# Patient Record
Sex: FEMALE | Race: BLACK OR AFRICAN AMERICAN | NOT HISPANIC OR LATINO | Employment: PART TIME | URBAN - METROPOLITAN AREA
[De-identification: names, ages, dates, MRNs, and addresses within clinical notes are randomized per-mention and may not be internally consistent; named-entity substitution may affect disease eponyms.]

---

## 2020-07-05 ENCOUNTER — APPOINTMENT (EMERGENCY)
Dept: RADIOLOGY | Facility: HOSPITAL | Age: 20
End: 2020-07-05
Payer: COMMERCIAL

## 2020-07-05 ENCOUNTER — HOSPITAL ENCOUNTER (EMERGENCY)
Facility: HOSPITAL | Age: 20
Discharge: HOME | End: 2020-07-05
Attending: EMERGENCY MEDICINE | Admitting: EMERGENCY MEDICINE
Payer: COMMERCIAL

## 2020-07-05 VITALS
DIASTOLIC BLOOD PRESSURE: 62 MMHG | TEMPERATURE: 97.8 F | SYSTOLIC BLOOD PRESSURE: 121 MMHG | RESPIRATION RATE: 18 BRPM | HEART RATE: 66 BPM | OXYGEN SATURATION: 100 %

## 2020-07-05 DIAGNOSIS — R19.7 DIARRHEA: Primary | ICD-10-CM

## 2020-07-05 LAB
ALBUMIN SERPL BCP-MCNC: 3.8 G/DL (ref 3.5–5)
ALP SERPL-CCNC: 76 U/L (ref 46–116)
ALT SERPL W P-5'-P-CCNC: 55 U/L (ref 12–78)
ANION GAP SERPL CALCULATED.3IONS-SCNC: 8 MMOL/L (ref 4–13)
APTT PPP: 22 SECONDS (ref 23–37)
AST SERPL W P-5'-P-CCNC: 31 U/L (ref 5–45)
BACTERIA UR QL AUTO: ABNORMAL /HPF
BASOPHILS # BLD AUTO: 0.02 THOUSANDS/ΜL (ref 0–0.1)
BASOPHILS NFR BLD AUTO: 0 % (ref 0–1)
BILIRUB SERPL-MCNC: 0.7 MG/DL (ref 0.2–1)
BILIRUB UR QL STRIP: NEGATIVE
BUN SERPL-MCNC: 9 MG/DL (ref 5–25)
CALCIUM SERPL-MCNC: 8.5 MG/DL (ref 8.3–10.1)
CHLORIDE SERPL-SCNC: 103 MMOL/L (ref 100–108)
CLARITY UR: CLEAR
CO2 SERPL-SCNC: 26 MMOL/L (ref 21–32)
COLOR UR: YELLOW
CREAT SERPL-MCNC: 0.82 MG/DL (ref 0.6–1.3)
EOSINOPHIL # BLD AUTO: 0.33 THOUSAND/ΜL (ref 0–0.61)
EOSINOPHIL NFR BLD AUTO: 6 % (ref 0–6)
ERYTHROCYTE [DISTWIDTH] IN BLOOD BY AUTOMATED COUNT: 12.7 % (ref 11.6–15.1)
EXT PREG TEST URINE: NEGATIVE
EXT. CONTROL ED NAV: NORMAL
GFR SERPL CREATININE-BSD FRML MDRD: 119 ML/MIN/1.73SQ M
GLUCOSE SERPL-MCNC: 93 MG/DL (ref 65–140)
GLUCOSE UR STRIP-MCNC: NEGATIVE MG/DL
HCT VFR BLD AUTO: 39.9 % (ref 34.8–46.1)
HGB BLD-MCNC: 13.4 G/DL (ref 11.5–15.4)
HGB UR QL STRIP.AUTO: NEGATIVE
IMM GRANULOCYTES # BLD AUTO: 0.02 THOUSAND/UL (ref 0–0.2)
IMM GRANULOCYTES NFR BLD AUTO: 0 % (ref 0–2)
INR PPP: 0.99 (ref 0.84–1.19)
KETONES UR STRIP-MCNC: NEGATIVE MG/DL
LEUKOCYTE ESTERASE UR QL STRIP: ABNORMAL
LIPASE SERPL-CCNC: 173 U/L (ref 73–393)
LYMPHOCYTES # BLD AUTO: 1.62 THOUSANDS/ΜL (ref 0.6–4.47)
LYMPHOCYTES NFR BLD AUTO: 27 % (ref 14–44)
MAGNESIUM SERPL-MCNC: 1.9 MG/DL (ref 1.6–2.6)
MCH RBC QN AUTO: 32 PG (ref 26.8–34.3)
MCHC RBC AUTO-ENTMCNC: 33.6 G/DL (ref 31.4–37.4)
MCV RBC AUTO: 95 FL (ref 82–98)
MONOCYTES # BLD AUTO: 0.6 THOUSAND/ΜL (ref 0.17–1.22)
MONOCYTES NFR BLD AUTO: 10 % (ref 4–12)
NEUTROPHILS # BLD AUTO: 3.42 THOUSANDS/ΜL (ref 1.85–7.62)
NEUTS SEG NFR BLD AUTO: 57 % (ref 43–75)
NITRITE UR QL STRIP: NEGATIVE
NON-SQ EPI CELLS URNS QL MICRO: ABNORMAL /HPF
NRBC BLD AUTO-RTO: 0 /100 WBCS
OTHER STN SPEC: ABNORMAL
PH UR STRIP.AUTO: 7 [PH]
PLATELET # BLD AUTO: 257 THOUSANDS/UL (ref 149–390)
PMV BLD AUTO: 11.6 FL (ref 8.9–12.7)
POTASSIUM SERPL-SCNC: 4 MMOL/L (ref 3.5–5.3)
PROT SERPL-MCNC: 7.9 G/DL (ref 6.4–8.2)
PROT UR STRIP-MCNC: NEGATIVE MG/DL
PROTHROMBIN TIME: 13 SECONDS (ref 11.6–14.5)
RBC # BLD AUTO: 4.19 MILLION/UL (ref 3.81–5.12)
RBC #/AREA URNS AUTO: ABNORMAL /HPF
SODIUM SERPL-SCNC: 137 MMOL/L (ref 136–145)
SP GR UR STRIP.AUTO: 1.01 (ref 1–1.03)
UROBILINOGEN UR QL STRIP.AUTO: 0.2 E.U./DL
WBC # BLD AUTO: 6.01 THOUSAND/UL (ref 4.31–10.16)
WBC #/AREA URNS AUTO: ABNORMAL /HPF

## 2020-07-05 PROCEDURE — 85025 COMPLETE CBC W/AUTO DIFF WBC: CPT | Performed by: PHYSICIAN ASSISTANT

## 2020-07-05 PROCEDURE — 99284 EMERGENCY DEPT VISIT MOD MDM: CPT

## 2020-07-05 PROCEDURE — 83690 ASSAY OF LIPASE: CPT | Performed by: PHYSICIAN ASSISTANT

## 2020-07-05 PROCEDURE — 96374 THER/PROPH/DIAG INJ IV PUSH: CPT

## 2020-07-05 PROCEDURE — 83735 ASSAY OF MAGNESIUM: CPT | Performed by: PHYSICIAN ASSISTANT

## 2020-07-05 PROCEDURE — 96361 HYDRATE IV INFUSION ADD-ON: CPT

## 2020-07-05 PROCEDURE — 99285 EMERGENCY DEPT VISIT HI MDM: CPT | Performed by: PHYSICIAN ASSISTANT

## 2020-07-05 PROCEDURE — 85610 PROTHROMBIN TIME: CPT | Performed by: PHYSICIAN ASSISTANT

## 2020-07-05 PROCEDURE — 36415 COLL VENOUS BLD VENIPUNCTURE: CPT | Performed by: PHYSICIAN ASSISTANT

## 2020-07-05 PROCEDURE — 74177 CT ABD & PELVIS W/CONTRAST: CPT

## 2020-07-05 PROCEDURE — 80053 COMPREHEN METABOLIC PANEL: CPT | Performed by: PHYSICIAN ASSISTANT

## 2020-07-05 PROCEDURE — 81001 URINALYSIS AUTO W/SCOPE: CPT | Performed by: PHYSICIAN ASSISTANT

## 2020-07-05 PROCEDURE — 85730 THROMBOPLASTIN TIME PARTIAL: CPT | Performed by: PHYSICIAN ASSISTANT

## 2020-07-05 PROCEDURE — 81025 URINE PREGNANCY TEST: CPT | Performed by: PHYSICIAN ASSISTANT

## 2020-07-05 RX ORDER — ONDANSETRON 4 MG/1
4 TABLET, ORALLY DISINTEGRATING ORAL EVERY 6 HOURS PRN
Qty: 20 TABLET | Refills: 0 | Status: SHIPPED | OUTPATIENT
Start: 2020-07-05

## 2020-07-05 RX ORDER — ONDANSETRON 2 MG/ML
4 INJECTION INTRAMUSCULAR; INTRAVENOUS ONCE
Status: COMPLETED | OUTPATIENT
Start: 2020-07-05 | End: 2020-07-05

## 2020-07-05 RX ORDER — ESCITALOPRAM OXALATE 20 MG/1
20 TABLET ORAL DAILY
COMMUNITY

## 2020-07-05 RX ADMIN — SODIUM CHLORIDE 1000 ML: 0.9 INJECTION, SOLUTION INTRAVENOUS at 14:07

## 2020-07-05 RX ADMIN — ONDANSETRON 4 MG: 2 INJECTION INTRAMUSCULAR; INTRAVENOUS at 14:17

## 2020-07-05 RX ADMIN — IOHEXOL 100 ML: 350 INJECTION, SOLUTION INTRAVENOUS at 15:06

## 2020-07-05 NOTE — ED PROVIDER NOTES
History  Chief Complaint   Patient presents with    Diarrhea     pt presents to the ed with diarrhea for 1 wk  the pt states when she wiped she saw a blood clot      25-year-old female presents with diarrhea x1 week  She states that prior to the diarrhea she missed several doses of Lexapro which she has restarted  She states that she has had 2-3 soft bowel a day  Her normal bowel habits are having a bowel movement twice a week  Today after having a bowel movement she noticed a blood clot when she wiped  LMP 3 months ago, has history of irregular periods, not on any birth control, family history of endometriosis and ovarian cysts  Not sexually active  Has not seen an gyn  No fever, chills, chest pain, difficulty breathing, shortness of breath  No problems with urination  Prior to Admission Medications   Prescriptions Last Dose Informant Patient Reported? Taking?   escitalopram (LEXAPRO) 20 mg tablet   Yes Yes   Sig: Take 20 mg by mouth daily      Facility-Administered Medications: None       Past Medical History:   Diagnosis Date    Anxiety     Asthma        History reviewed  No pertinent surgical history  History reviewed  No pertinent family history  I have reviewed and agree with the history as documented  E-Cigarette/Vaping     E-Cigarette/Vaping Substances     Social History     Tobacco Use    Smoking status: Never Smoker   Substance Use Topics    Alcohol use: Never     Frequency: Never    Drug use: Yes     Types: Marijuana       Review of Systems   Constitutional: Negative for chills and fever  HENT: Negative for sneezing and sore throat  Respiratory: Negative for cough and shortness of breath  Cardiovascular: Negative for chest pain, palpitations and leg swelling  Gastrointestinal: Positive for abdominal pain and diarrhea  Negative for constipation, nausea and vomiting     Genitourinary: Negative for decreased urine volume, difficulty urinating, dysuria, flank pain, frequency, hematuria, pelvic pain, urgency, vaginal bleeding, vaginal discharge and vaginal pain  Musculoskeletal: Negative for back pain, gait problem and joint swelling  Skin: Negative for color change, pallor, rash and wound  Neurological: Negative for dizziness, syncope, weakness, light-headedness, numbness and headaches  All other systems reviewed and are negative  Physical Exam  Physical Exam   Constitutional: She appears well-developed and well-nourished  No distress  HENT:   Head: Normocephalic and atraumatic  Nose: Nose normal    Eyes: EOM are normal    Neck: Normal range of motion  Cardiovascular: Normal rate, regular rhythm, normal heart sounds and intact distal pulses  Exam reveals no gallop and no friction rub  No murmur heard  Pulmonary/Chest: Effort normal and breath sounds normal  No stridor  No respiratory distress  She has no wheezes  She has no rales  Sp02 is 99% indicating adequate oxygenation on room air   Abdominal: Soft  Bowel sounds are normal  She exhibits no distension and no mass  There is tenderness  There is no guarding  Generalized tenderness over lower abdomen without peritoneal signs  Negative CVA tenderness  Negative Muhammad sign  Negative McBurney point   Genitourinary: Rectum normal  Rectal exam shows no external hemorrhoid, no internal hemorrhoid, no fissure, no mass, no tenderness, anal tone normal and guaiac negative stool  Genitourinary Comments: Rectal exam performed with Codey Norton RN as chaperone  Negative hemoccult test, no hemorrhoids or fissures   Skin: Skin is warm and dry  Capillary refill takes less than 2 seconds  No rash noted  She is not diaphoretic  No erythema  No pallor  Nursing note and vitals reviewed        Vital Signs  ED Triage Vitals   Temperature Pulse Respirations Blood Pressure SpO2   07/05/20 1323 07/05/20 1323 07/05/20 1323 07/05/20 1323 07/05/20 1323   97 8 °F (36 6 °C) 87 20 139/84 99 %      Temp Source Heart Rate Source Patient Position - Orthostatic VS BP Location FiO2 (%)   07/05/20 1323 07/05/20 1323 07/05/20 1531 07/05/20 1531 --   Tympanic Monitor Lying Left arm       Pain Score       --                  Vitals:    07/05/20 1323 07/05/20 1531   BP: 139/84 121/62   Pulse: 87 66   Patient Position - Orthostatic VS:  Lying         Visual Acuity      ED Medications  Medications   sodium chloride 0 9 % bolus 1,000 mL (0 mL Intravenous Stopped 7/5/20 1507)   ondansetron (ZOFRAN) injection 4 mg (4 mg Intravenous Given 7/5/20 1417)   iohexol (OMNIPAQUE) 350 MG/ML injection (MULTI-DOSE) 100 mL (100 mL Intravenous Given 7/5/20 1506)       Diagnostic Studies  Results Reviewed     Procedure Component Value Units Date/Time    Urine Microscopic [[id number]]  (Abnormal) Collected:  07/05/20 1445    Lab Status:  Final result Specimen:  Urine, Clean Catch Updated:  07/05/20 1503     RBC, UA None Seen /hpf      WBC, UA 4-10 /hpf      Epithelial Cells Occasional /hpf      Bacteria, UA Occasional /hpf      OTHER OBSERVATIONS Renal Epithelial Cells Present    UA w Reflex to Microscopic w Reflex to Culture [[id number]]  (Abnormal) Collected:  07/05/20 1445    Lab Status:  Final result Specimen:  Urine, Clean Catch Updated:  07/05/20 1454     Color, UA Yellow     Clarity, UA Clear     Specific Gravity, UA 1 010     pH, UA 7 0     Leukocytes, UA Small     Nitrite, UA Negative     Protein, UA Negative mg/dl      Glucose, UA Negative mg/dl      Ketones, UA Negative mg/dl      Urobilinogen, UA 0 2 E U /dl      Bilirubin, UA Negative     Blood, UA Negative    POCT pregnancy, urine [[id number]]  (Normal) Resulted:  07/05/20 1445    Lab Status:  Final result Updated:  07/05/20 1452     EXT PREG TEST UR (Ref: Negative) negative     Control valid    Lipase [[id number]]  (Normal) Collected:  07/05/20 1346    Lab Status:  Final result Specimen:  Blood from Arm, Left Updated:  07/05/20 1408     Lipase 173 u/L     Comprehensive metabolic panel [[id number]] Collected: 07/05/20 1346    Lab Status:  Final result Specimen:  Blood from Arm, Left Updated:  07/05/20 1408     Sodium 137 mmol/L      Potassium 4 0 mmol/L      Chloride 103 mmol/L      CO2 26 mmol/L      ANION GAP 8 mmol/L      BUN 9 mg/dL      Creatinine 0 82 mg/dL      Glucose 93 mg/dL      Calcium 8 5 mg/dL      AST 31 U/L      ALT 55 U/L      Alkaline Phosphatase 76 U/L      Total Protein 7 9 g/dL      Albumin 3 8 g/dL      Total Bilirubin 0 70 mg/dL      eGFR 119 ml/min/1 73sq m     Narrative:       [place] guidelines for Chronic Kidney Disease (CKD):     Stage 1 with normal or high GFR (GFR > 90 mL/min/1 73 square meters)    Stage 2 Mild CKD (GFR = 60-89 mL/min/1 73 square meters)    Stage 3A Moderate CKD (GFR = 45-59 mL/min/1 73 square meters)    Stage 3B Moderate CKD (GFR = 30-44 mL/min/1 73 square meters)    Stage 4 Severe CKD (GFR = 15-29 mL/min/1 73 square meters)    Stage 5 End Stage CKD (GFR <15 mL/min/1 73 square meters)  Note: GFR calculation is accurate only with a steady state creatinine    Magnesium [[id number]]  (Normal) Collected:  07/05/20 1346    Lab Status:  Final result Specimen:  Blood from Arm, Left Updated:  07/05/20 1408     Magnesium 1 9 mg/dL     Protime-INR [[id number]]  (Normal) Collected:  07/05/20 1346    Lab Status:  Final result Specimen:  Blood from Arm, Left Updated:  07/05/20 1407     Protime 13 0 seconds      INR 0 99    APTT [[id number]]  (Abnormal) Collected:  07/05/20 1346    Lab Status:  Final result Specimen:  Blood from Arm, Left Updated:  07/05/20 1407     PTT 22 seconds     CBC and differential [[id number]] Collected:  07/05/20 1346    Lab Status:  Final result Specimen:  Blood from Arm, Left Updated:  07/05/20 1352     WBC 6 01 Thousand/uL      RBC 4 19 Million/uL      Hemoglobin 13 4 g/dL      Hematocrit 39 9 %      MCV 95 fL      MCH 32 0 pg      MCHC 33 6 g/dL      RDW 12 7 %      MPV 11 6 fL      Platelets 103 Thousands/uL      nRBC 0 /100 WBCs      Neutrophils Relative 57 %      Immat GRANS % 0 %      Lymphocytes Relative 27 %      Monocytes Relative 10 %      Eosinophils Relative 6 %      Basophils Relative 0 %      Neutrophils Absolute 3 42 Thousands/µL      Immature Grans Absolute 0 02 Thousand/uL      Lymphocytes Absolute 1 62 Thousands/µL      Monocytes Absolute 0 60 Thousand/µL      Eosinophils Absolute 0 33 Thousand/µL      Basophils Absolute 0 02 Thousands/µL                  CT abdomen pelvis with contrast   Final Result by Judah Viveros MD (07/05 1536)      No acute abnormality in the abdomen or pelvis  Workstation performed: [id number]                    Procedures  Procedures         ED Course                                             MDM  Number of Diagnoses or Management Options  Diarrhea:   Diagnosis management comments: Patient well appearing, afebrile, in no acute distress  Advised BRAT diet and frequent handwashing  Will trial on zofran prn nausea  States she would like to follow up with a gyn for routine well checks, given info  Gave patient proper education regarding diagnosis  Answered all questions  Return to ED for any worsening of symptoms otherwise follow up with primary care physician for re-evaluation  Discussed plan with patient who verbalized understanding and agreed to plan         Amount and/or Complexity of Data Reviewed  Clinical lab tests: reviewed and ordered  Tests in the radiology section of CPT®: ordered and reviewed  Tests in the medicine section of CPT®: ordered and reviewed  Discussion of test results with the performing providers: yes  Review and summarize past medical records: yes  Discuss the patient with other providers: yes  Independent visualization of images, tracings, or specimens: yes          Disposition  Final diagnoses:   Diarrhea     Time reflects when diagnosis was documented in both MDM as applicable and the Disposition within this note     Time User Action Codes Description Comment 7/5/2020  3:40 PM Desiree Lezama Add [R19 7] Diarrhea       ED Disposition     ED Disposition Condition Date/Time Comment    Discharge Stable Sun Jul 5, 2020  3:40 PM Ama Small discharge to home/self care  Follow-up Information     Follow up With Specialties Details Why Contact Info Additional Information    Koby Flood MD Obstetrics and Gynecology, Obstetrics, Gynecology Schedule an appointment as soon as possible for a visit in 1 week for routine gyn care [address] [facility] [address]  Emergency Department Emergency Medicine Go to  As needed [address] [facility] ED, [place], [place], [address]          Patient's Medications   Discharge Prescriptions    ONDANSETRON (ZOFRAN-ODT) 4 MG DISINTEGRATING TABLET    Take 1 tablet (4 mg total) by mouth every 6 (six) hours as needed for nausea or vomiting       Start Date: 7/5/2020  End Date: --       Order Dose: 4 mg       Quantity: 20 tablet    Refills: 0     No discharge procedures on file      PDMP Review     None          ED Provider  Electronically Signed by           Alida Heller PA-C  07/05/20 6530

## 2021-11-07 ENCOUNTER — HOSPITAL ENCOUNTER (EMERGENCY)
Facility: HOSPITAL | Age: 21
Discharge: HOME | End: 2021-11-07
Attending: EMERGENCY MEDICINE | Admitting: EMERGENCY MEDICINE
Payer: COMMERCIAL

## 2021-11-07 VITALS
BODY MASS INDEX: 33.63 KG/M2 | SYSTOLIC BLOOD PRESSURE: 145 MMHG | OXYGEN SATURATION: 99 % | WEIGHT: 197 LBS | RESPIRATION RATE: 18 BRPM | HEART RATE: 92 BPM | TEMPERATURE: 97.8 F | HEIGHT: 64 IN | DIASTOLIC BLOOD PRESSURE: 70 MMHG

## 2021-11-07 DIAGNOSIS — R11.2 NAUSEA AND VOMITING: Primary | ICD-10-CM

## 2021-11-07 DIAGNOSIS — F41.9 ANXIETY: ICD-10-CM

## 2021-11-07 DIAGNOSIS — T50.905A ADVERSE EFFECT OF DRUG, INITIAL ENCOUNTER: ICD-10-CM

## 2021-11-07 LAB
ALBUMIN SERPL BCP-MCNC: 4.4 G/DL (ref 3.5–5)
ALP SERPL-CCNC: 73 U/L (ref 46–116)
ALT SERPL W P-5'-P-CCNC: 69 U/L (ref 12–78)
AMPHETAMINES SERPL QL SCN: NEGATIVE
ANION GAP SERPL CALCULATED.3IONS-SCNC: 21 MMOL/L (ref 4–13)
AST SERPL W P-5'-P-CCNC: 25 U/L (ref 5–45)
BACTERIA UR QL AUTO: NORMAL /HPF
BARBITURATES UR QL: NEGATIVE
BASOPHILS # BLD AUTO: 0.01 THOUSANDS/ΜL (ref 0–0.1)
BASOPHILS NFR BLD AUTO: 0 % (ref 0–1)
BENZODIAZ UR QL: NEGATIVE
BILIRUB SERPL-MCNC: 1.08 MG/DL (ref 0.2–1)
BILIRUB UR QL STRIP: ABNORMAL
BUN SERPL-MCNC: 6 MG/DL (ref 5–25)
CALCIUM SERPL-MCNC: 9.7 MG/DL (ref 8.3–10.1)
CHLORIDE SERPL-SCNC: 100 MMOL/L (ref 100–108)
CLARITY UR: CLEAR
CO2 SERPL-SCNC: 16 MMOL/L (ref 21–32)
COCAINE UR QL: NEGATIVE
COLOR UR: ABNORMAL
CREAT SERPL-MCNC: 0.89 MG/DL (ref 0.6–1.3)
EOSINOPHIL # BLD AUTO: 0 THOUSAND/ΜL (ref 0–0.61)
EOSINOPHIL NFR BLD AUTO: 0 % (ref 0–6)
ERYTHROCYTE [DISTWIDTH] IN BLOOD BY AUTOMATED COUNT: 13.2 % (ref 11.6–15.1)
EXT PREG TEST URINE: NEGATIVE
EXT. CONTROL ED NAV: NORMAL
GFR SERPL CREATININE-BSD FRML MDRD: 107 ML/MIN/1.73SQ M
GLUCOSE SERPL-MCNC: 114 MG/DL (ref 65–140)
GLUCOSE UR STRIP-MCNC: NEGATIVE MG/DL
HCT VFR BLD AUTO: 39.8 % (ref 34.8–46.1)
HGB BLD-MCNC: 13.8 G/DL (ref 11.5–15.4)
HGB UR QL STRIP.AUTO: NEGATIVE
IMM GRANULOCYTES # BLD AUTO: 0.03 THOUSAND/UL (ref 0–0.2)
IMM GRANULOCYTES NFR BLD AUTO: 0 % (ref 0–2)
KETONES UR STRIP-MCNC: ABNORMAL MG/DL
LEUKOCYTE ESTERASE UR QL STRIP: NEGATIVE
LIPASE SERPL-CCNC: 183 U/L (ref 73–393)
LYMPHOCYTES # BLD AUTO: 1.03 THOUSANDS/ΜL (ref 0.6–4.47)
LYMPHOCYTES NFR BLD AUTO: 14 % (ref 14–44)
MAGNESIUM SERPL-MCNC: 1.9 MG/DL (ref 1.6–2.6)
MCH RBC QN AUTO: 31.6 PG (ref 26.8–34.3)
MCHC RBC AUTO-ENTMCNC: 34.7 G/DL (ref 31.4–37.4)
MCV RBC AUTO: 91 FL (ref 82–98)
METHADONE UR QL: NEGATIVE
MONOCYTES # BLD AUTO: 0.35 THOUSAND/ΜL (ref 0.17–1.22)
MONOCYTES NFR BLD AUTO: 5 % (ref 4–12)
NEUTROPHILS # BLD AUTO: 5.85 THOUSANDS/ΜL (ref 1.85–7.62)
NEUTS SEG NFR BLD AUTO: 81 % (ref 43–75)
NITRITE UR QL STRIP: NEGATIVE
NON-SQ EPI CELLS URNS QL MICRO: NORMAL /HPF
NRBC BLD AUTO-RTO: 0 /100 WBCS
OPIATES UR QL SCN: NEGATIVE
OXYCODONE+OXYMORPHONE UR QL SCN: NEGATIVE
PCP UR QL: NEGATIVE
PH UR STRIP.AUTO: 6 [PH]
PLATELET # BLD AUTO: 225 THOUSANDS/UL (ref 149–390)
PMV BLD AUTO: 12.1 FL (ref 8.9–12.7)
POTASSIUM SERPL-SCNC: 3.1 MMOL/L (ref 3.5–5.3)
PROT SERPL-MCNC: 8.1 G/DL (ref 6.4–8.2)
PROT UR STRIP-MCNC: ABNORMAL MG/DL
RBC # BLD AUTO: 4.37 MILLION/UL (ref 3.81–5.12)
RBC #/AREA URNS AUTO: NORMAL /HPF
SODIUM SERPL-SCNC: 137 MMOL/L (ref 136–145)
SP GR UR STRIP.AUTO: >=1.03 (ref 1–1.03)
THC UR QL: POSITIVE
UROBILINOGEN UR QL STRIP.AUTO: 0.2 E.U./DL
WBC # BLD AUTO: 7.27 THOUSAND/UL (ref 4.31–10.16)
WBC #/AREA URNS AUTO: NORMAL /HPF

## 2021-11-07 PROCEDURE — 83690 ASSAY OF LIPASE: CPT | Performed by: EMERGENCY MEDICINE

## 2021-11-07 PROCEDURE — 99284 EMERGENCY DEPT VISIT MOD MDM: CPT | Performed by: EMERGENCY MEDICINE

## 2021-11-07 PROCEDURE — 81025 URINE PREGNANCY TEST: CPT | Performed by: EMERGENCY MEDICINE

## 2021-11-07 PROCEDURE — 96375 TX/PRO/DX INJ NEW DRUG ADDON: CPT

## 2021-11-07 PROCEDURE — 80053 COMPREHEN METABOLIC PANEL: CPT | Performed by: EMERGENCY MEDICINE

## 2021-11-07 PROCEDURE — 99283 EMERGENCY DEPT VISIT LOW MDM: CPT

## 2021-11-07 PROCEDURE — 96361 HYDRATE IV INFUSION ADD-ON: CPT

## 2021-11-07 PROCEDURE — 96374 THER/PROPH/DIAG INJ IV PUSH: CPT

## 2021-11-07 PROCEDURE — 83735 ASSAY OF MAGNESIUM: CPT | Performed by: EMERGENCY MEDICINE

## 2021-11-07 PROCEDURE — 80307 DRUG TEST PRSMV CHEM ANLYZR: CPT | Performed by: EMERGENCY MEDICINE

## 2021-11-07 PROCEDURE — 85025 COMPLETE CBC W/AUTO DIFF WBC: CPT | Performed by: EMERGENCY MEDICINE

## 2021-11-07 PROCEDURE — 36415 COLL VENOUS BLD VENIPUNCTURE: CPT | Performed by: EMERGENCY MEDICINE

## 2021-11-07 PROCEDURE — 81001 URINALYSIS AUTO W/SCOPE: CPT | Performed by: EMERGENCY MEDICINE

## 2021-11-07 RX ORDER — LORAZEPAM 2 MG/ML
1 INJECTION INTRAMUSCULAR ONCE
Status: COMPLETED | OUTPATIENT
Start: 2021-11-07 | End: 2021-11-07

## 2021-11-07 RX ORDER — ONDANSETRON 2 MG/ML
4 INJECTION INTRAMUSCULAR; INTRAVENOUS ONCE
Status: COMPLETED | OUTPATIENT
Start: 2021-11-07 | End: 2021-11-07

## 2021-11-07 RX ADMIN — ONDANSETRON 4 MG: 2 INJECTION INTRAMUSCULAR; INTRAVENOUS at 19:21

## 2021-11-07 RX ADMIN — SODIUM CHLORIDE 1000 ML: 0.9 INJECTION, SOLUTION INTRAVENOUS at 19:11

## 2021-11-07 RX ADMIN — ONDANSETRON 4 MG: 2 INJECTION INTRAMUSCULAR; INTRAVENOUS at 19:07

## 2021-11-07 RX ADMIN — LORAZEPAM 1 MG: 2 INJECTION INTRAMUSCULAR; INTRAVENOUS at 19:05

## 2023-01-28 ENCOUNTER — HOSPITAL ENCOUNTER (EMERGENCY)
Facility: HOSPITAL | Age: 23
Discharge: HOME | End: 2023-01-28
Attending: EMERGENCY MEDICINE

## 2023-01-28 VITALS
HEART RATE: 66 BPM | TEMPERATURE: 97.9 F | SYSTOLIC BLOOD PRESSURE: 123 MMHG | OXYGEN SATURATION: 98 % | DIASTOLIC BLOOD PRESSURE: 77 MMHG | RESPIRATION RATE: 16 BRPM

## 2023-01-28 DIAGNOSIS — N39.0 UTI (URINARY TRACT INFECTION): Primary | ICD-10-CM

## 2023-01-28 LAB
AMORPH PHOS CRY URNS QL MICRO: ABNORMAL /HPF
BACTERIA UR QL AUTO: ABNORMAL /HPF
BILIRUB UR QL STRIP: NEGATIVE
CLARITY UR: CLEAR
COLOR UR: YELLOW
EXT PREGNANCY TEST URINE: NEGATIVE
EXT. CONTROL: NORMAL
GLUCOSE UR STRIP-MCNC: NEGATIVE MG/DL
HGB UR QL STRIP.AUTO: NEGATIVE
KETONES UR STRIP-MCNC: NEGATIVE MG/DL
LEUKOCYTE ESTERASE UR QL STRIP: ABNORMAL
NITRITE UR QL STRIP: NEGATIVE
NON-SQ EPI CELLS URNS QL MICRO: ABNORMAL /HPF
PH UR STRIP.AUTO: 7 [PH]
PROT UR STRIP-MCNC: NEGATIVE MG/DL
RBC #/AREA URNS AUTO: ABNORMAL /HPF
SP GR UR STRIP.AUTO: 1.01 (ref 1–1.03)
UROBILINOGEN UR QL STRIP.AUTO: 0.2 E.U./DL
WBC #/AREA URNS AUTO: ABNORMAL /HPF

## 2023-01-28 RX ORDER — CEPHALEXIN 500 MG/1
500 CAPSULE ORAL ONCE
Status: COMPLETED | OUTPATIENT
Start: 2023-01-28 | End: 2023-01-28

## 2023-01-28 RX ORDER — CEPHALEXIN 500 MG/1
500 CAPSULE ORAL EVERY 6 HOURS SCHEDULED
Qty: 28 CAPSULE | Refills: 0 | Status: SHIPPED | OUTPATIENT
Start: 2023-01-28 | End: 2023-02-04

## 2023-01-28 RX ADMIN — CEPHALEXIN 500 MG: 500 CAPSULE ORAL at 10:32

## 2023-01-28 NOTE — ED PROVIDER NOTES
History  Chief Complaint   Patient presents with   • Possible UTI     Pt reports urinary discomfort and frequency for "about 2 weeks"     Patient is a 70-year-old black female with history of anxiety and asthma who reports 2-week history of urinary frequency and mildly malodorous urine  No fever or shaking chills  No nausea vomiting or diarrhea  No hematuria  No back or flank pain  History of 1 prior UTI last year  States symptoms feel similar  [facility] 1/8/2023  Patient is not sexually active  No vaginal bleeding or discharge  Prior to Admission Medications   Prescriptions Last Dose Informant Patient Reported? Taking?   escitalopram (LEXAPRO) 20 mg tablet   Yes No   Sig: Take 20 mg by mouth daily   ondansetron (ZOFRAN-ODT) 4 mg disintegrating tablet   No No   Sig: Take 1 tablet (4 mg total) by mouth every 6 (six) hours as needed for nausea or vomiting      Facility-Administered Medications: None       Past Medical History:   Diagnosis Date   • Anxiety    • Asthma        History reviewed  No pertinent surgical history  History reviewed  No pertinent family history  I have reviewed and agree with the history as documented  E-Cigarette/Vaping     E-Cigarette/Vaping Substances     Social History     Tobacco Use   • Smoking status: Never   • Smokeless tobacco: Never   Substance Use Topics   • Alcohol use: Never   • Drug use: Yes     Types: Marijuana       Review of Systems   Constitutional: Negative for chills and fever  HENT: Negative for ear pain and sore throat  Respiratory: Negative for cough and shortness of breath  Cardiovascular: Negative for chest pain and palpitations  Gastrointestinal: Negative for abdominal pain, diarrhea, nausea and vomiting  Genitourinary: Positive for dysuria and frequency  Negative for flank pain and hematuria  Musculoskeletal: Negative for back pain  Skin: Negative for color change and rash  Neurological: Negative for syncope     All other systems reviewed and are negative  Physical Exam  Physical Exam  Vitals and nursing note reviewed  Constitutional:       General: She is not in acute distress  Appearance: Normal appearance  She is not ill-appearing, toxic-appearing or diaphoretic  HENT:      Head: Normocephalic and atraumatic  Right Ear: Tympanic membrane, ear canal and external ear normal       Left Ear: Tympanic membrane, ear canal and external ear normal       Nose: Nose normal       Mouth/Throat:      Mouth: Mucous membranes are moist       Pharynx: Oropharynx is clear  Eyes:      Extraocular Movements: Extraocular movements intact  Conjunctiva/sclera: Conjunctivae normal       Pupils: Pupils are equal, round, and reactive to light  Cardiovascular:      Rate and Rhythm: Normal rate and regular rhythm  Pulses: Normal pulses  Heart sounds: Normal heart sounds  Pulmonary:      Effort: Pulmonary effort is normal       Breath sounds: Normal breath sounds  Abdominal:      General: Abdomen is flat  Bowel sounds are normal       Tenderness: There is no right CVA tenderness, left CVA tenderness, guarding or rebound  Comments: Mild suprapubic tenderness   Musculoskeletal:         General: Normal range of motion  Cervical back: Normal range of motion and neck supple  Skin:     General: Skin is warm and dry  Capillary Refill: Capillary refill takes less than 2 seconds  Neurological:      General: No focal deficit present  Mental Status: She is alert and oriented to person, place, and time  Mental status is at baseline           Vital Signs  ED Triage Vitals [01/28/23 0914]   Temperature Pulse Respirations Blood Pressure SpO2   97 9 °F (36 6 °C) 66 16 123/77 98 %      Temp Source Heart Rate Source Patient Position - Orthostatic VS BP Location FiO2 (%)   Tympanic Monitor Sitting Right arm --      Pain Score       4           Vitals:    01/28/23 0914   BP: 123/77   Pulse: 66   Patient Position - Orthostatic VS: Sitting         Visual Acuity      ED Medications  Medications   cephalexin (KEFLEX) capsule 500 mg (has no administration in time range)       Diagnostic Studies  Results Reviewed     Procedure Component Value Units Date/Time    Urine Microscopic [[id number]]  (Abnormal) Collected: 01/28/23 0939    Lab Status: Final result Specimen: Urine, Clean Catch Updated: 01/28/23 1017     RBC, UA 0-1 /hpf      WBC, UA 4-10 /hpf      Epithelial Cells Occasional /hpf      Bacteria, UA Innumerable /hpf      AMORPH PHOSPATES Occasional /hpf     UA w Reflex to Microscopic w Reflex to Culture [[id number]]  (Abnormal) Collected: 01/28/23 0939    Lab Status: Final result Specimen: Urine, Clean Catch Updated: 01/28/23 0949     Color, UA Yellow     Clarity, UA Clear     Specific Gravity, UA 1 015     pH, UA 7 0     Leukocytes, UA Small     Nitrite, UA Negative     Protein, UA Negative mg/dl      Glucose, UA Negative mg/dl      Ketones, UA Negative mg/dl      Urobilinogen, UA 0 2 E U /dl      Bilirubin, UA Negative     Occult Blood, UA Negative    POCT pregnancy, urine [[id number]]  (Normal) Resulted: 01/28/23 0945    Lab Status: Final result Updated: 01/28/23 0945     EXT Preg Test, Ur Negative     Control Valid                 No orders to display              Procedures  Procedures         ED Course                                             Medical Decision Making  19-year-old black female with 2-week history urinary frequency  Well-appearing  Nontoxic  Signs and symptoms are consistent with urinary tract infection corroborated by her UA results  There is no concern for pyelonephritis at this time given no flank pain or flank tenderness or systemic symptoms  Will treat with Keflex x7 days  Encouraged oral hydration  Advised to follow-up with her primary care provider next 2 to 3 days if no improvement  Urine culture will be followed and patient will be called if results warrant change in treatment    Return precautions given, including fever, shaking chills, nausea or vomiting, flank pain    UTI (urinary tract infection): acute illness or injury  Amount and/or Complexity of Data Reviewed  Labs: ordered  Risk  Prescription drug management  Disposition  Final diagnoses:   UTI (urinary tract infection)     Time reflects when diagnosis was documented in both MDM as applicable and the Disposition within this note     Time User Action Codes Description Comment    1/28/2023 10:23 AM Gill Jones Add [N39 0] UTI (urinary tract infection)       ED Disposition     ED Disposition   Discharge    Condition   Stable    Date/Time   Sat Jan 28, 2023 10:23 AM    Gian Lennon discharge to home/self care  Follow-up Information     Follow up With Specialties Details Why Contact Info Additional Information    [address] [facility] Emergency Department Emergency Medicine   [address] [facility] [address]  [address] [address] Emergency Department, [place], [place], [address]          Patient's Medications   Discharge Prescriptions    CEPHALEXIN (KEFLEX) 500 MG CAPSULE    Take 1 capsule (500 mg total) by mouth every 6 (six) hours for 7 days       Start Date: 1/28/2023 End Date: 2/4/2023       Order Dose: 500 mg       Quantity: 28 capsule    Refills: 0       No discharge procedures on file      PDMP Review     None          ED Provider  Electronically Signed by           Ana Dee PA-C  01/28/23 0175

## 2023-01-28 NOTE — DISCHARGE INSTRUCTIONS
Drink plenty of fluids    Follow up care provider if no improvement next 2 to 3 days  We have sent urine culture and will  call you if results warrant change in treatment    Return to the ED for fever, shaking chills, flank pain, vomiting

## 2023-03-14 ENCOUNTER — HOSPITAL ENCOUNTER (EMERGENCY)
Facility: HOSPITAL | Age: 23
Discharge: HOME | End: 2023-03-15
Attending: EMERGENCY MEDICINE

## 2023-03-14 VITALS
OXYGEN SATURATION: 99 % | TEMPERATURE: 98.3 F | HEART RATE: 102 BPM | SYSTOLIC BLOOD PRESSURE: 131 MMHG | RESPIRATION RATE: 20 BRPM | BODY MASS INDEX: 27.05 KG/M2 | DIASTOLIC BLOOD PRESSURE: 75 MMHG | WEIGHT: 157.6 LBS

## 2023-03-14 DIAGNOSIS — R11.15 CYCLIC VOMITING SYNDROME: Primary | ICD-10-CM

## 2023-03-14 DIAGNOSIS — F12.90 MARIJUANA USE: ICD-10-CM

## 2023-03-14 LAB
ALBUMIN SERPL BCP-MCNC: 4.4 G/DL (ref 3.5–5)
ALP SERPL-CCNC: 49 U/L (ref 34–104)
ALT SERPL W P-5'-P-CCNC: 16 U/L (ref 7–52)
AMPHETAMINES SERPL QL SCN: NEGATIVE
ANION GAP SERPL CALCULATED.3IONS-SCNC: 13 MMOL/L (ref 4–13)
AST SERPL W P-5'-P-CCNC: 17 U/L (ref 13–39)
BACTERIA UR QL AUTO: ABNORMAL /HPF
BARBITURATES UR QL: NEGATIVE
BASOPHILS # BLD AUTO: 0.03 THOUSANDS/ÂΜL (ref 0–0.1)
BASOPHILS NFR BLD AUTO: 0 % (ref 0–1)
BENZODIAZ UR QL: NEGATIVE
BILIRUB SERPL-MCNC: 0.69 MG/DL (ref 0.2–1)
BILIRUB UR QL STRIP: NEGATIVE
BUN SERPL-MCNC: 14 MG/DL (ref 5–25)
CALCIUM SERPL-MCNC: 9.1 MG/DL (ref 8.4–10.2)
CHLORIDE SERPL-SCNC: 108 MMOL/L (ref 96–108)
CLARITY UR: CLEAR
CO2 SERPL-SCNC: 17 MMOL/L (ref 21–32)
COCAINE UR QL: NEGATIVE
COLOR UR: ABNORMAL
CREAT SERPL-MCNC: 0.7 MG/DL (ref 0.6–1.3)
EOSINOPHIL # BLD AUTO: 0.08 THOUSAND/ÂΜL (ref 0–0.61)
EOSINOPHIL NFR BLD AUTO: 1 % (ref 0–6)
ERYTHROCYTE [DISTWIDTH] IN BLOOD BY AUTOMATED COUNT: 11.8 % (ref 11.6–15.1)
FLUAV RNA RESP QL NAA+PROBE: NEGATIVE
FLUBV RNA RESP QL NAA+PROBE: NEGATIVE
GFR SERPL CREATININE-BSD FRML MDRD: 123 ML/MIN/1.73SQ M
GLUCOSE SERPL-MCNC: 118 MG/DL (ref 65–140)
GLUCOSE UR STRIP-MCNC: NEGATIVE MG/DL
HCT VFR BLD AUTO: 41.1 % (ref 34.8–46.1)
HGB BLD-MCNC: 14.6 G/DL (ref 11.5–15.4)
HGB UR QL STRIP.AUTO: NEGATIVE
IMM GRANULOCYTES # BLD AUTO: 0.03 THOUSAND/UL (ref 0–0.2)
IMM GRANULOCYTES NFR BLD AUTO: 0 % (ref 0–2)
KETONES UR STRIP-MCNC: NEGATIVE MG/DL
LEUKOCYTE ESTERASE UR QL STRIP: NEGATIVE
LIPASE SERPL-CCNC: 41 U/L (ref 11–82)
LYMPHOCYTES # BLD AUTO: 2.19 THOUSANDS/ÂΜL (ref 0.6–4.47)
LYMPHOCYTES NFR BLD AUTO: 24 % (ref 14–44)
MAGNESIUM SERPL-MCNC: 1.6 MG/DL (ref 1.9–2.7)
MCH RBC QN AUTO: 32.6 PG (ref 26.8–34.3)
MCHC RBC AUTO-ENTMCNC: 35.5 G/DL (ref 31.4–37.4)
MCV RBC AUTO: 92 FL (ref 82–98)
METHADONE UR QL: NEGATIVE
MONOCYTES # BLD AUTO: 0.62 THOUSAND/ÂΜL (ref 0.17–1.22)
MONOCYTES NFR BLD AUTO: 7 % (ref 4–12)
MUCOUS THREADS UR QL AUTO: ABNORMAL
NEUTROPHILS # BLD AUTO: 6.25 THOUSANDS/ÂΜL (ref 1.85–7.62)
NEUTS SEG NFR BLD AUTO: 68 % (ref 43–75)
NITRITE UR QL STRIP: NEGATIVE
NON-SQ EPI CELLS URNS QL MICRO: ABNORMAL /HPF
NRBC BLD AUTO-RTO: 0 /100 WBCS
OPIATES UR QL SCN: NEGATIVE
OXYCODONE+OXYMORPHONE UR QL SCN: NEGATIVE
PCP UR QL: NEGATIVE
PH UR STRIP.AUTO: 6.5 [PH]
PLATELET # BLD AUTO: 290 THOUSANDS/UL (ref 149–390)
PMV BLD AUTO: 11.1 FL (ref 8.9–12.7)
POTASSIUM SERPL-SCNC: 3.3 MMOL/L (ref 3.5–5.3)
PROT SERPL-MCNC: 7.5 G/DL (ref 6.4–8.4)
PROT UR STRIP-MCNC: ABNORMAL MG/DL
RBC # BLD AUTO: 4.48 MILLION/UL (ref 3.81–5.12)
RBC #/AREA URNS AUTO: ABNORMAL /HPF
RSV RNA RESP QL NAA+PROBE: NEGATIVE
SARS-COV-2 RNA RESP QL NAA+PROBE: NEGATIVE
SODIUM SERPL-SCNC: 138 MMOL/L (ref 135–147)
SP GR UR STRIP.AUTO: 1.02 (ref 1–1.03)
THC UR QL: POSITIVE
UROBILINOGEN UR QL STRIP.AUTO: 0.2 E.U./DL
WBC # BLD AUTO: 9.2 THOUSAND/UL (ref 4.31–10.16)
WBC #/AREA URNS AUTO: ABNORMAL /HPF

## 2023-03-14 RX ORDER — POTASSIUM CHLORIDE 20 MEQ/1
40 TABLET, EXTENDED RELEASE ORAL ONCE
Status: COMPLETED | OUTPATIENT
Start: 2023-03-14 | End: 2023-03-14

## 2023-03-14 RX ORDER — LORAZEPAM 2 MG/ML
1 INJECTION INTRAMUSCULAR ONCE
Status: COMPLETED | OUTPATIENT
Start: 2023-03-14 | End: 2023-03-14

## 2023-03-14 RX ORDER — FAMOTIDINE 10 MG/ML
40 INJECTION, SOLUTION INTRAVENOUS ONCE
Status: COMPLETED | OUTPATIENT
Start: 2023-03-14 | End: 2023-03-14

## 2023-03-14 RX ORDER — ONDANSETRON 2 MG/ML
4 INJECTION INTRAMUSCULAR; INTRAVENOUS ONCE
Status: DISCONTINUED | OUTPATIENT
Start: 2023-03-14 | End: 2023-03-15 | Stop reason: HOSPADM

## 2023-03-14 RX ORDER — ONDANSETRON 2 MG/ML
4 INJECTION INTRAMUSCULAR; INTRAVENOUS ONCE
Status: COMPLETED | OUTPATIENT
Start: 2023-03-14 | End: 2023-03-14

## 2023-03-14 RX ADMIN — SODIUM CHLORIDE 1000 ML: 0.9 INJECTION, SOLUTION INTRAVENOUS at 22:21

## 2023-03-14 RX ADMIN — SODIUM CHLORIDE 1000 ML: 0.9 INJECTION, SOLUTION INTRAVENOUS at 23:28

## 2023-03-14 RX ADMIN — LORAZEPAM 1 MG: 2 INJECTION INTRAMUSCULAR; INTRAVENOUS at 22:43

## 2023-03-14 RX ADMIN — ONDANSETRON 4 MG: 2 INJECTION INTRAMUSCULAR; INTRAVENOUS at 22:22

## 2023-03-14 RX ADMIN — POTASSIUM CHLORIDE 40 MEQ: 1500 TABLET, EXTENDED RELEASE ORAL at 23:40

## 2023-03-14 RX ADMIN — FAMOTIDINE 40 MG: 10 INJECTION, SOLUTION INTRAVENOUS at 22:30

## 2023-03-15 LAB
ANION GAP SERPL CALCULATED.3IONS-SCNC: 7 MMOL/L (ref 4–13)
BUN SERPL-MCNC: 12 MG/DL (ref 5–25)
CALCIUM SERPL-MCNC: 7.6 MG/DL (ref 8.4–10.2)
CHLORIDE SERPL-SCNC: 113 MMOL/L (ref 96–108)
CO2 SERPL-SCNC: 20 MMOL/L (ref 21–32)
CREAT SERPL-MCNC: 0.6 MG/DL (ref 0.6–1.3)
GFR SERPL CREATININE-BSD FRML MDRD: 129 ML/MIN/1.73SQ M
GLUCOSE SERPL-MCNC: 96 MG/DL (ref 65–140)
POTASSIUM SERPL-SCNC: 3.9 MMOL/L (ref 3.5–5.3)
SODIUM SERPL-SCNC: 140 MMOL/L (ref 135–147)

## 2023-03-15 RX ORDER — ONDANSETRON 4 MG/1
4 TABLET, ORALLY DISINTEGRATING ORAL EVERY 6 HOURS PRN
Qty: 12 TABLET | Refills: 0 | Status: SHIPPED | OUTPATIENT
Start: 2023-03-15

## 2023-03-15 NOTE — ED NOTES
Pt sleeping with no sign of distress  Upon awaking pt stated she is no longer nauseous        Lay Dee [facility]  03/14/23 0397

## 2023-03-15 NOTE — ED PROVIDER NOTES
History  Chief Complaint   Patient presents with   • Vomiting     Very strong odor of cannabis when enters triage room  Hx of cyclical vomiting  Started eating chinese food at 1 pm , eating it on and off  Hyperventilating on arrival  Numbness hands and feet  Denies abdominal pain      25-year-old female with past history of asthma, anxiety, cyclical vomiting syndrome, presents to the ED for evaluation of nausea and vomiting intermittently since this afternoon  Patient states that she was having some [employer] food from a new store and she is not sure whether she has food poisoning  Patient states that her cyclical vomiting can be triggered by severe anxiety as well as food poisoning  Patient did not take anything for her nausea tonight  Patient does have Zofran at home for her nausea/vomiting  Patient denies any abdominal pain, diarrhea, dysuria, or any increased urinary frequency  Patient denies any fevers or chills  Patient states that she smokes marijuana to help her with her cyclic vomiting vomiting syndrome  Earlier today she smoked marijuana however it was not helping with her nausea and vomiting  History provided by:  Patient  Vomiting  Associated symptoms: no abdominal pain, no arthralgias, no chills, no cough, no fever and no sore throat        Prior to Admission Medications   Prescriptions Last Dose Informant Patient Reported? Taking?   escitalopram (LEXAPRO) 20 mg tablet   Yes No   Sig: Take 20 mg by mouth daily   ondansetron (ZOFRAN-ODT) 4 mg disintegrating tablet   No No   Sig: Take 1 tablet (4 mg total) by mouth every 6 (six) hours as needed for nausea or vomiting      Facility-Administered Medications: None       Past Medical History:   Diagnosis Date   • Anxiety    • Asthma        History reviewed  No pertinent surgical history  History reviewed  No pertinent family history  I have reviewed and agree with the history as documented      E-Cigarette/Vaping   • E-Cigarette Use Never User E-Cigarette/Vaping Substances     Social History     Tobacco Use   • Smoking status: Never   • Smokeless tobacco: Never   Vaping Use   • Vaping Use: Never used   Substance Use Topics   • Alcohol use: Never   • Drug use: Yes     Types: Marijuana     Comment: smokes 3 times a week  Review of Systems   Constitutional: Negative for chills and fever  HENT: Negative for ear pain and sore throat  Eyes: Negative for pain and visual disturbance  Respiratory: Negative for cough and shortness of breath  Cardiovascular: Negative for chest pain and palpitations  Gastrointestinal: Positive for nausea and vomiting  Negative for abdominal pain  Genitourinary: Negative for dysuria and hematuria  Musculoskeletal: Negative for arthralgias and back pain  Skin: Negative for color change and rash  Neurological: Negative for seizures and syncope  All other systems reviewed and are negative  Physical Exam  Physical Exam  Vitals and nursing note reviewed  Constitutional:       General: She is not in acute distress  Appearance: She is well-developed  HENT:      Head: Normocephalic and atraumatic  Eyes:      Conjunctiva/sclera: Conjunctivae normal    Cardiovascular:      Rate and Rhythm: Normal rate and regular rhythm  Heart sounds: No murmur heard  Pulmonary:      Effort: Pulmonary effort is normal  No respiratory distress  Breath sounds: Normal breath sounds  Abdominal:      Palpations: Abdomen is soft  Tenderness: There is no abdominal tenderness  Comments: Abdomen is soft, nondistended, with bowel sound present to all 4 quadrants  No tenderness noted to palpation of abdomen  Musculoskeletal:         General: No swelling  Cervical back: Neck supple  Skin:     General: Skin is warm and dry  Capillary Refill: Capillary refill takes less than 2 seconds  Neurological:      Mental Status: She is alert     Psychiatric:         Mood and Affect: Mood normal  Vital Signs  ED Triage Vitals   Temperature Pulse Respirations Blood Pressure SpO2   03/14/23 2127 03/14/23 2127 03/14/23 2127 03/14/23 2127 03/14/23 2127   98 3 °F (36 8 °C) 104 (!) 28 120/71 100 %      Temp Source Heart Rate Source Patient Position - Orthostatic VS BP Location FiO2 (%)   03/14/23 2127 03/14/23 2127 03/14/23 2127 03/14/23 2127 --   Tympanic Monitor Sitting Left arm       Pain Score       03/14/23 2342       No Pain           Vitals:    03/14/23 2127 03/14/23 2342 03/14/23 2345   BP: 120/71 131/75 131/75   Pulse: 104 95 102   Patient Position - Orthostatic VS: Sitting Sitting Sitting         Visual Acuity      ED Medications  Medications   ondansetron (ZOFRAN) injection 4 mg (0 mg Intravenous Hold 3/14/23 2342)   sodium chloride 0 9 % bolus 1,000 mL (0 mL Intravenous Stopped 3/14/23 2321)   ondansetron (ZOFRAN) injection 4 mg (4 mg Intravenous Given 3/14/23 2222)   Famotidine (PF) (PEPCID) injection 40 mg (40 mg Intravenous Given 3/14/23 2230)   LORazepam (ATIVAN) injection 1 mg (1 mg Intravenous Given 3/14/23 2243)   sodium chloride 0 9 % bolus 1,000 mL (0 mL Intravenous Stopped 3/15/23 0028)   potassium chloride (K-DUR,KLOR-CON) CR tablet 40 mEq (40 mEq Oral Given 3/14/23 2340)       Diagnostic Studies  Results Reviewed     Procedure Component Value Units Date/Time    Basic metabolic panel [[id number]]  (Abnormal) Collected: 03/15/23 0039    Lab Status: Final result Specimen: Blood from Arm, Left Updated: 03/15/23 0143     Sodium 140 mmol/L      Potassium 3 9 mmol/L      Chloride 113 mmol/L      CO2 20 mmol/L      ANION GAP 7 mmol/L      BUN 12 mg/dL      Creatinine 0 60 mg/dL      Glucose 96 mg/dL      Calcium 7 6 mg/dL      eGFR 129 ml/min/1 73sq m     Narrative:      [place] guidelines for Chronic Kidney Disease (CKD):   •  Stage 1 with normal or high GFR (GFR > 90 mL/min/1 73 square meters)  •  Stage 2 Mild CKD (GFR = 60-89 mL/min/1 73 square meters)  •  Stage 3A Moderate CKD (GFR = 45-59 mL/min/1 73 square meters)  •  Stage 3B Moderate CKD (GFR = 30-44 mL/min/1 73 square meters)  •  Stage 4 Severe CKD (GFR = 15-29 mL/min/1 73 square meters)  •  Stage 5 End Stage CKD (GFR <15 mL/min/1 73 square meters)  Note: GFR calculation is accurate only with a steady state creatinine    Lipase [[id number]]  (Normal) Collected: 03/14/23 2219    Lab Status: Final result Specimen: Blood from Arm, Left Updated: 03/14/23 2346     Lipase 41 u/L     FLU/RSV/COVID - if FLU/RSV clinically relevant [[id number]]  (Normal) Collected: 03/14/23 2220    Lab Status: Final result Specimen: Nares from Nasopharyngeal Swab Updated: 03/14/23 2311     SARS-CoV-2 Negative     INFLUENZA A PCR Negative     INFLUENZA B PCR Negative     RSV PCR Negative    Narrative:      FOR PEDIATRIC PATIENTS - copy/paste COVID Guidelines URL to browser: [email or web address]  [employer]    SARS-CoV-2 assay is a Nucleic Acid Amplification assay intended for the  qualitative detection of nucleic acid from SARS-CoV-2 in nasopharyngeal  swabs  Results are for the presumptive identification of SARS-CoV-2 RNA  Positive results are indicative of infection with SARS-CoV-2, the virus  causing COVID-19, but do not rule out bacterial infection or co-infection  with other viruses  Laboratories within the [place] and its  territories are required to report all positive results to the appropriate  public health authorities  Negative results do not preclude SARS-CoV-2  infection and should not be used as the sole basis for treatment or other  patient management decisions  Negative results must be combined with  clinical observations, patient history, and epidemiological information  This test has not been FDA cleared or approved  This test has been authorized by FDA under an Emergency Use Authorization  (EUA)   This test is only authorized for the duration of time the  declaration that circumstances exist justifying the authorization of the  emergency use of an in vitro diagnostic tests for detection of SARS-CoV-2  virus and/or diagnosis of COVID-19 infection under section 564(b)(1) of  the Act, 21 U  S C  207LWY-3(I)(8), unless the authorization is terminated  or revoked sooner  The test has been validated but independent review by FDA  and CLIA is pending  Test performed using [employer] Gene[employer]pert: This RT-PCR assay targets N2,  a region unique to SARS-CoV-2  A conserved region in the E-gene was chosen  for pan-Sarbecovirus detection which includes SARS-CoV-2  According to CMS-2020-01-R, this platform meets the definition of high-throughput technology      Comprehensive metabolic panel [[id number]]  (Abnormal) Collected: 03/14/23 2219    Lab Status: Final result Specimen: Blood from Arm, Left Updated: 03/14/23 2302     Sodium 138 mmol/L      Potassium 3 3 mmol/L      Chloride 108 mmol/L      CO2 17 mmol/L      ANION GAP 13 mmol/L      BUN 14 mg/dL      Creatinine 0 70 mg/dL      Glucose 118 mg/dL      Calcium 9 1 mg/dL      AST 17 U/L      ALT 16 U/L      Alkaline Phosphatase 49 U/L      Total Protein 7 5 g/dL      Albumin 4 4 g/dL      Total Bilirubin 0 69 mg/dL      eGFR 123 ml/min/1 73sq m     Narrative:      [facility] guidelines for Chronic Kidney Disease (CKD):   •  Stage 1 with normal or high GFR (GFR > 90 mL/min/1 73 square meters)  •  Stage 2 Mild CKD (GFR = 60-89 mL/min/1 73 square meters)  •  Stage 3A Moderate CKD (GFR = 45-59 mL/min/1 73 square meters)  •  Stage 3B Moderate CKD (GFR = 30-44 mL/min/1 73 square meters)  •  Stage 4 Severe CKD (GFR = 15-29 mL/min/1 73 square meters)  •  Stage 5 End Stage CKD (GFR <15 mL/min/1 73 square meters)  Note: GFR calculation is accurate only with a steady state creatinine    Magnesium [[id number]]  (Abnormal) Collected: 03/14/23 2219    Lab Status: Final result Specimen: Blood from Arm, Left Updated: 03/14/23 2302 Magnesium 1 6 mg/dL     CBC and differential [[id number]] Collected: 03/14/23 2219    Lab Status: Final result Specimen: Blood from Arm, Left Updated: 03/14/23 2301     WBC 9 20 Thousand/uL      RBC 4 48 Million/uL      Hemoglobin 14 6 g/dL      Hematocrit 41 1 %      MCV 92 fL      MCH 32 6 pg      MCHC 35 5 g/dL      RDW 11 8 %      MPV 11 1 fL      Platelets 137 Thousands/uL      nRBC 0 /100 WBCs      Neutrophils Relative 68 %      Immat GRANS % 0 %      Lymphocytes Relative 24 %      Monocytes Relative 7 %      Eosinophils Relative 1 %      Basophils Relative 0 %      Neutrophils Absolute 6 25 Thousands/µL      Immature Grans Absolute 0 03 Thousand/uL      Lymphocytes Absolute 2 19 Thousands/µL      Monocytes Absolute 0 62 Thousand/µL      Eosinophils Absolute 0 08 Thousand/µL      Basophils Absolute 0 03 Thousands/µL     Urine Microscopic [[id number]]  (Abnormal) Collected: 03/14/23 2221    Lab Status: Final result Specimen: Urine, Clean Catch Updated: 03/14/23 2259     RBC, UA None Seen /hpf      WBC, UA 2-4 /hpf      Epithelial Cells Occasional /hpf      Bacteria, UA Occasional /hpf      MUCUS THREADS Occasional    Rapid drug screen, urine [[id number]]  (Abnormal) Collected: 03/14/23 2221    Lab Status: Final result Specimen: Urine, Clean Catch Updated: 03/14/23 2257     Amph/Meth UR Negative     Barbiturate Ur Negative     Benzodiazepine Urine Negative     Cocaine Urine Negative     Methadone Urine Negative     Opiate Urine Negative     PCP Ur Negative     THC Urine Positive     Oxycodone Urine Negative    Narrative:      Presumptive report  If requested, specimen will be sent to reference lab for confirmation  FOR MEDICAL PURPOSES ONLY  IF CONFIRMATION NEEDED PLEASE CONTACT THE LAB WITHIN 5 DAYS      Drug Screen Cutoff Levels:  AMPHETAMINE/METHAMPHETAMINES  1000 ng/mL  BARBITURATES     200 ng/mL  BENZODIAZEPINES     200 ng/mL  COCAINE      300 ng/mL  METHADONE      300 ng/mL  OPIATES      300 ng/mL  PHENCYCLIDINE     25 ng/mL  THC       50 ng/mL  OXYCODONE      100 ng/mL    UA w Reflex to Microscopic w Reflex to Culture [[id number]]  (Abnormal) Collected: 03/14/23 2221    Lab Status: Final result Specimen: Urine, Clean Catch Updated: 03/14/23 2237     Color, UA Light Yellow     Clarity, UA Clear     Specific Gravity, UA 1 025     pH, UA 6 5     Leukocytes, UA Negative     Nitrite, UA Negative     Protein, UA Trace mg/dl      Glucose, UA Negative mg/dl      Ketones, UA Negative mg/dl      Urobilinogen, UA 0 2 E U /dl      Bilirubin, UA Negative     Occult Blood, UA Negative                 No orders to display              Procedures  Procedures         ED Course  ED Course as of 03/15/23 0240   Tue Mar 14, 2023   2328 Patient reexamined at bedside  Patient is feeling much better  Nausea improved  Patient is currently receiving her second liter of IV fluid bolus  Potassium is low and is repleted  CO2 is mildly low as well  After second liter of bolus is completed, will repeat BMP and then discharge patient  Wed Mar 15, 2023   0234 Patient reexamined at bedside  Patient feels better  BMP is improved  Patient will be discharged home  Patient requesting refill of her Zofran  Medical Decision Making  Obtain blood work, UA, UDS, oral swab  Give IV fluids, antiemetics and continue to monitor patient for any worsening symptoms    Patient's initial CMP showed low potassium as well as low bicarb  This may be secondary to her vomiting  Patient given p o  potassium  Patient felt better after antiemetics, Ativan, as well as 2 L of IV fluid bolus  After IV fluid bolus and p o  potassium, follow-up BMP showed improvement  Patient feels significantly better  At this time patient is discharged home with follow-up to her PCP  Patient given refill of Zofran  Nausea and vomiting  Close return instructions given to return to the ER for any worsening symptoms  Patient agrees with discharge plan  Patient well appearing at time of discharge  Please Note: [employer] voice recognition software may have been used in the creation of this document  Wrong words or sound a like substitutions may have occurred due to the inherent limitations of the voice software  Amount and/or Complexity of Data Reviewed  Labs: ordered  Decision-making details documented in ED Course  Risk  Prescription drug management  Disposition  Final diagnoses:   Cyclic vomiting syndrome   Marijuana use     Time reflects when diagnosis was documented in both MDM as applicable and the Disposition within this note     Time User Action Codes Description Comment    3/15/2023  2:36 AM Delta Hugger Add [W62 07] Cyclic vomiting syndrome     3/15/2023  2:37 AM Delta Hugger Add [F12 90] Marijuana use       ED Disposition     ED Disposition   Discharge    Condition   Stable    Date/Time   Wed Mar 15, 2023  2:36 AM    Comment   Elva Escudero discharge to home/self care  Follow-up Information     Follow up With Specialties Details Why Contact Info    Your Family Doctor  Schedule an appointment as soon as possible for a visit             Patient's Medications   Discharge Prescriptions    ONDANSETRON (ZOFRAN-ODT) 4 MG DISINTEGRATING TABLET    Take 1 tablet (4 mg total) by mouth every 6 (six) hours as needed for nausea or vomiting       Start Date: 3/15/2023 End Date: --       Order Dose: 4 mg       Quantity: 12 tablet    Refills: 0       No discharge procedures on file      PDMP Review     None          ED Provider  Electronically Signed by           Tigist Flaherty DO  03/15/23 0240